# Patient Record
Sex: FEMALE | Race: WHITE | NOT HISPANIC OR LATINO | Employment: STUDENT | URBAN - NONMETROPOLITAN AREA
[De-identification: names, ages, dates, MRNs, and addresses within clinical notes are randomized per-mention and may not be internally consistent; named-entity substitution may affect disease eponyms.]

---

## 2017-05-17 ENCOUNTER — TRANSCRIBE ORDERS (OUTPATIENT)
Dept: ADMINISTRATIVE | Facility: HOSPITAL | Age: 22
End: 2017-05-17

## 2017-05-17 DIAGNOSIS — G89.29 CHRONIC NONINTRACTABLE HEADACHE, UNSPECIFIED HEADACHE TYPE: Primary | ICD-10-CM

## 2017-05-17 DIAGNOSIS — R51.9 CHRONIC NONINTRACTABLE HEADACHE, UNSPECIFIED HEADACHE TYPE: Primary | ICD-10-CM

## 2017-05-25 ENCOUNTER — HOSPITAL ENCOUNTER (OUTPATIENT)
Dept: MRI IMAGING | Facility: HOSPITAL | Age: 22
Discharge: HOME | End: 2017-05-25
Admitting: FAMILY MEDICINE

## 2017-05-25 DIAGNOSIS — R51.9 CHRONIC NONINTRACTABLE HEADACHE, UNSPECIFIED HEADACHE TYPE: ICD-10-CM

## 2017-05-25 DIAGNOSIS — G89.29 CHRONIC NONINTRACTABLE HEADACHE, UNSPECIFIED HEADACHE TYPE: ICD-10-CM

## 2017-05-25 PROCEDURE — 70551 MRI BRAIN STEM W/O DYE: CPT | Performed by: RADIOLOGY

## 2017-05-25 PROCEDURE — 70551 MRI BRAIN STEM W/O DYE: CPT

## 2017-12-15 ENCOUNTER — HOSPITAL ENCOUNTER (OUTPATIENT)
Dept: SPEECH THERAPY | Facility: HOSPITAL | Age: 22
Setting detail: THERAPIES SERIES
Discharge: HOME | End: 2017-12-15

## 2017-12-15 DIAGNOSIS — IMO0001: Primary | ICD-10-CM

## 2017-12-15 PROCEDURE — 92523 SPEECH SOUND LANG COMPREHEN: CPT | Performed by: SPEECH-LANGUAGE PATHOLOGIST

## 2017-12-15 NOTE — PROGRESS NOTES
"Outpatient Speech Language Pathology   Adult Speech Language Cognitive Initial Evaluation and Discharge  [facility]     Patient Name: Yvette Horn  : 1995  MRN: [id number]  Today's Date: 12/15/2017        Visit Date: 12/15/2017   There is no problem list on file for this patient.       History reviewed. No pertinent past medical history.     No past surgical history on file.      Visit Dx:    ICD-10-CM ICD-9-CM   1. Mild neurocognitive disorder due to traumatic brain injury, initial encounter S06.9X9A 854.01    G31.84        Yvette Horn is seen this pm at [facility] for a speech therapy evaluation.  Yvette serves as informant for self for evaluation.  Yvette is a 22 year old female who attends [employer] and plays softball.  Yvette reports that on 2017, she was hit in face mask w/ softball x3 during a collegiate softball game.  She reports that she did not seek medical attention until approximately 5 days later.  At this time, she was diagnosed w/ a \"concussion\" and placed on \"hold\" for all softball activities and w/ \"minimal driving\" restriction.  Yvette reports she had an MRI in May that was found WFL.  She has since had further testing and has been diagnosed w/ \"post concussion syndrome\" per pt report. Pt also reports that her \"face is numb\".    Yvette reports that she has maintained her schooling, driving, and extracurricular activities, excluding softball at this time.  She reports that she functions independently and \"rarely\" has any difficulties w/ language, speech, or cognitive functioning skills for activities of daily living.  Yvette states that she has maintained at grade level for education and does not experience any difficulties w/ her educational studies. Yvette reports that she \"occasionally has difficulty focusing and multitasking\", and that she is \"occasionally\" forgetful\".     Formal speech, language, and " cognitive testing was completed by SLP and found to be WFL in all areas assessed. Yvette states that her current functioning does not hold her back from any activities of daily living.  Further speech therapy treatment is not recommended at this time as pt is found to be WFL in all areas addressed.  Pt is informed that if she has any changes or difficulties that arise in the further to contact her MD for further evaluation.  SLP d/w pt results and recommendations w/ verbal understanding and agreement.            Adult Speech Language - 12/15/17 1300     Background and History    Reason for Referral Pt states she had concussion in March, now w/ post concussion syndrome w/ daily headaches.  -KS    Previous Functional Status Functional in all spheres  -KS    Primary Language in the Home English   -KS    Primary Caregiver Parent;Other (comment)   Self  -KS    Informant for the Evaluation Self  -KS    Comprehension    Recognition WFL: Within Functional Limits  -KS    Answer Questions WFL: Within Functional Limits  -KS    Commands WFL: Within Functional Limits  -KS    Conversation WFL: Within Functional Limits  -KS    Reading Status WFL: Within Functional Limits  -KS    Expression    Primary Mode of Expression verbal  -KS    Dominant Hand Right  -KS    Expressive Language WFL  -KS    Receptive Language WFL  -KS    Automatic Speech WFL  -KS    Repetition WFL  -KS    Expressive Production WFL  -KS    Pragmatics Comprehension WFL  -KS    Pragmatics Production WFL  -KS    Written Expression WFL  -KS    Cognitive Communication and Memory    Attention WFL: Within Functional Limits  -KS    Orientation WFL: Within Functional Limits  -KS    Memory WFL: Within Functional Limits  -KS    Executive Function WFL: Within Functional Limits  -KS    Calculations WFL: Within Functional Limits  -KS    Right Hemisphere Impairment WFL: Within Functional Limits  -KS    Oral Motor    Facial Appearance WFL;other (comment)   Pt states face is  "\"numb\",however no facial deficits observed  -KS    Dentition adequate  -KS    Secretions manages secretions (comment)  -KS    Lips WFL  -KS    Tongue WFL  -KS    Palate WFL  -KS    Cheeks WFL  -KS    Jaw WFL  -KS    Oral Motor Planning    Imitating Isolated Oral Movements accurate  -KS    Producing Isolated Oral Movement on Command accurate, immediate, on command  -KS    Imitating Sequental Oral Movements accurate  -KS    Producing Sequential Oral Motor Movements on Command accurate, immediate, on command  -KS    Laryngeal Exam    Laryngeal Function WFL  -KS    Resonance    Resonance WFL  -KS    Comprehensibility of Message    Message is Usually Comprehensible To: examiner;unfamiliar listeners;friends;family/caregiver  -KS    Efficiency of Verbal Communication    Verbal Messages are: completed in a timely way;sound natural  -KS      User Key  (r) = Recorded By, (t) = Taken By, (c) = Cosigned By    Initials Name Provider Type    JAMES Mcgowan Speech Therapist              OP SLP Education       12/15/17 1300    Education    Barriers to Learning No barriers identified  -KS    Education Provided Described results of evaluation;Patient expressed understanding of evaluation  -KS    Assessed Learning needs;Learning motivation;Learning preferences;Learning readiness  -KS    Learning Motivation Moderate;Strong  -KS    Learning Method Explanation;Demonstration  -KS    Teaching Response Verbalized understanding;Demonstrated understanding  -KS    Education Comments D/w pt results and recommendations from evaluation. Pt's speech, language, and cognitive skiills found to be WFL.  -KS      User Key  (r) = Recorded By, (t) = Taken By, (c) = Cosigned By    Initials Name Effective Dates    JAMES Mcgowan 05/15/17 -               OP SLP Assessment/Plan - 12/15/17 1300     SLP Assessment    Functional Problems --   WFL  -KS    Clinical Impression Comments WFL  -KS    Please refer to paper survey for additional self-reported " information Yes  -KS    Please refer to items scanned into chart for additional diagnostic informaiton and handouts as provided by clinician Yes  -KS    SLP Diagnosis Pt found WFL for speech, langauge, and cognition  -KS    Patient/caregiver participated in establishment of treatment plan and goals Yes  -KS    Patient would benefit from skilled therapy intervention Yes  -KS      User Key  (r) = Recorded By, (t) = Taken By, (c) = Cosigned By    Initials Name Provider Type    JAMES Mcgowan Speech Therapist        Time Calculation:   SLP Start Time: 1255  SLP Stop Time: 1351  SLP Time Calculation (min): 56 min  SLP Non-Billable Time (min): 30 min    Therapy Charges for Today     Code Description Service Date Service Provider Modifiers Qty    [id number]  ST CARE PLAN 15 MIN 12/15/2017 Neal BURGOS 2    [id number]  ST EVAL SPEECH AND PROD W LANG  4 12/15/2017 Neal BURGOS 1        Neal Mcgowan  12/15/2017

## 2017-12-15 NOTE — THERAPY DISCHARGE NOTE
Outpatient Speech Language Pathology   Adult Speech Language Cognitive Eval/Discharge   [place]     Patient Name: Yvette Horn  : 1995  MRN: [id number]  Today's Date: 12/15/2017         Visit Date: 12/15/2017    There is no problem list on file for this patient.       History reviewed. No pertinent past medical history.     No past surgical history on file.      Visit Dx:    ICD-10-CM ICD-9-CM   1. Mild neurocognitive disorder due to traumatic brain injury, initial encounter S06.9X9A 854.01    G31.84              Adult Speech Language - 12/15/17 1300     Background and History    Reason for Referral Pt states she had concussion in March, now w/ post concussion syndrome w/ daily headaches.  -KS    Previous Functional Status Functional in all spheres  -KS    Primary Language in the Home English   -KS    Primary Caregiver Parent;Other (comment)   Self  -KS    Informant for the Evaluation Self  -KS    Comprehension    Recognition WFL: Within Functional Limits  -KS    Answer Questions WFL: Within Functional Limits  -KS    Commands WFL: Within Functional Limits  -KS    Conversation WFL: Within Functional Limits  -KS    Reading Status WFL: Within Functional Limits  -KS    Expression    Primary Mode of Expression verbal  -KS    Dominant Hand Right  -KS    Expressive Language WFL  -KS    Receptive Language WFL  -KS    Automatic Speech WFL  -KS    Repetition WFL  -KS    Expressive Production WFL  -KS    Pragmatics Comprehension WFL  -KS    Pragmatics Production WFL  -KS    Written Expression WFL  -KS    Cognitive Communication and Memory    Attention WFL: Within Functional Limits  -KS    Orientation WFL: Within Functional Limits  -KS    Memory WFL: Within Functional Limits  -KS    Executive Function WFL: Within Functional Limits  -KS    Calculations WFL: Within Functional Limits  -KS    Right Hemisphere Impairment WFL: Within Functional Limits  -KS    Oral Motor    Facial Appearance WFL;other (comment)   Pt  "states face is \"numb\",however no facial deficits observed  -KS    Dentition adequate  -KS    Secretions manages secretions (comment)  -KS    Lips WFL  -KS    Tongue WFL  -KS    Palate WFL  -KS    Cheeks WFL  -KS    Jaw WFL  -KS    Oral Motor Planning    Imitating Isolated Oral Movements accurate  -KS    Producing Isolated Oral Movement on Command accurate, immediate, on command  -KS    Imitating Sequental Oral Movements accurate  -KS    Producing Sequential Oral Motor Movements on Command accurate, immediate, on command  -KS    Laryngeal Exam    Laryngeal Function WFL  -KS    Resonance    Resonance WFL  -KS    Comprehensibility of Message    Message is Usually Comprehensible To: examiner;unfamiliar listeners;friends;family/caregiver  -KS    Efficiency of Verbal Communication    Verbal Messages are: completed in a timely way;sound natural  -KS      User Key  (r) = Recorded By, (t) = Taken By, (c) = Cosigned By    Initials Name Provider Type    JAMES Mcgowan Speech Therapist              OP SLP Education       12/15/17 1300    Education    Barriers to Learning No barriers identified  -KS    Education Provided Described results of evaluation;Patient expressed understanding of evaluation  -KS    Assessed Learning needs;Learning motivation;Learning preferences;Learning readiness  -KS    Learning Motivation Moderate;Strong  -KS    Learning Method Explanation;Demonstration  -KS    Teaching Response Verbalized understanding;Demonstrated understanding  -KS    Education Comments D/w pt results and recommendations from evaluation. Pt's speech, language, and cognitive skiills found to be WFL.  -KS      User Key  (r) = Recorded By, (t) = Taken By, (c) = Cosigned By    Initials Name Effective Dates    JAMES Mcgowan 05/15/17 -               OP SLP Assessment/Plan - 12/15/17 1300     SLP Assessment    Functional Problems --   WFL  -KS    Clinical Impression Comments WFL  -KS    Please refer to paper survey for additional " self-reported information Yes  -KS    Please refer to items scanned into chart for additional diagnostic informaiton and handouts as provided by clinician Yes  -KS    SLP Diagnosis Pt found WFL for speech, langauge, and cognition  -KS    Patient/caregiver participated in establishment of treatment plan and goals Yes  -KS    Patient would benefit from skilled therapy intervention Yes  -KS      User Key  (r) = Recorded By, (t) = Taken By, (c) = Cosigned By    Initials Name Provider Type    JAMES Mcgowan Speech Therapist           Time Calculation:   SLP Start Time: 1255  SLP Stop Time: 1351  SLP Time Calculation (min): 56 min  SLP Non-Billable Time (min): 30 min    Therapy Charges for Today     Code Description Service Date Service Provider Modifiers Qty    [id number] HC ST CARE PLAN 15 MIN 12/15/2017 Neal BURGOS 2    [id number]  ST EVAL SPEECH AND PROD W LANG  4 12/15/2017 Neal BURGOS 1        OP SLP Discharge Summary  Date of Discharge: 12/15/17  Reason for Discharge: other (comment)  Outcomes Achieved: Discharge from facility occurred on same date as evluation      Neal Mcgowan  12/15/2017

## 2018-02-15 ENCOUNTER — TRANSCRIBE ORDERS (OUTPATIENT)
Dept: GENERAL RADIOLOGY | Facility: HOSPITAL | Age: 23
End: 2018-02-15

## 2018-02-15 ENCOUNTER — HOSPITAL ENCOUNTER (OUTPATIENT)
Dept: GENERAL RADIOLOGY | Facility: HOSPITAL | Age: 23
Discharge: HOME | End: 2018-02-15
Admitting: PSYCHIATRY & NEUROLOGY

## 2018-02-15 DIAGNOSIS — M54.2 NECK PAIN: ICD-10-CM

## 2018-02-15 DIAGNOSIS — M54.2 NECK PAIN: Primary | ICD-10-CM

## 2018-02-15 PROCEDURE — 72052 X-RAY EXAM NECK SPINE 6/>VWS: CPT

## 2018-02-15 PROCEDURE — 72052 X-RAY EXAM NECK SPINE 6/>VWS: CPT | Performed by: RADIOLOGY

## 2024-10-10 ENCOUNTER — TRANSCRIBE ORDERS (OUTPATIENT)
Dept: ADMINISTRATIVE | Facility: HOSPITAL | Age: 29
End: 2024-10-10
Payer: COMMERCIAL

## 2024-10-10 DIAGNOSIS — F07.81 POSTCONCUSSION SYNDROME: Primary | ICD-10-CM

## 2024-10-24 ENCOUNTER — HOSPITAL ENCOUNTER (OUTPATIENT)
Dept: MRI IMAGING | Facility: HOSPITAL | Age: 29
Discharge: HOME | End: 2024-10-24
Admitting: NURSE PRACTITIONER
Payer: COMMERCIAL

## 2024-10-24 DIAGNOSIS — F07.81 POSTCONCUSSION SYNDROME: ICD-10-CM

## 2024-10-24 PROCEDURE — 70552 MRI BRAIN STEM W/DYE: CPT

## 2024-10-24 PROCEDURE — 0 GADOBENATE DIMEGLUMINE 529 MG/ML SOLUTION: Performed by: NURSE PRACTITIONER

## 2024-10-24 PROCEDURE — A9577 INJ MULTIHANCE: HCPCS | Performed by: NURSE PRACTITIONER

## 2024-10-24 RX ADMIN — GADOBENATE DIMEGLUMINE 20 ML: 529 INJECTION, SOLUTION INTRAVENOUS at 09:51
